# Patient Record
(demographics unavailable — no encounter records)

---

## 2024-11-27 NOTE — PHYSICAL EXAM
[No Acute Distress] : no acute distress [Normal Oropharynx] : normal oropharynx [III] : Mallampati Class: III [No Neck Mass] : no neck mass [Normal Rate/Rhythm] : normal rate/rhythm [Clear to Auscultation Bilaterally] : clear to auscultation bilaterally [No Abnormalities] : no abnormalities [Benign] : benign [Normal Gait] : normal gait [No Clubbing] : no clubbing [Normal Color/ Pigmentation] : normal color/ pigmentation [No Focal Deficits] : no focal deficits [Oriented x3] : oriented x3

## 2024-11-27 NOTE — REASON FOR VISIT
[Follow-Up] : a follow-up visit [Pulmonary Embolism] : pulmonary embolism [Pulmonary Hypertension] : pulmonary hypertension [Shortness of Breath] : shortness of breath

## 2025-04-11 NOTE — REVIEW OF SYSTEMS
[Palpitations] : palpitations [Fever] : no fever [Dry Eyes] : no dry eyes [Cough] : no cough [Chest Discomfort] : no chest discomfort [Hay Fever] : no hay fever [GERD] : no gerd [Nocturia] : no nocturia [Arthralgias] : no arthralgias [Raynaud] : no raynaud [Anemia] : no anemia [Headache] : no headache [Depression] : no depression [Diabetes] : no diabetes

## 2025-04-11 NOTE — HISTORY OF PRESENT ILLNESS
[Never] : never [TextBox_4] : This letter  is regarding your patient  who  attended pulmonary out patient office today.  I have reviewed  patient's  past history, social history, family history and medication list. I also  reviewed nurse practitioners/ and fellows  notes and assessment and agree with it.   The patient was referred by ---------------50 -year-old female history of submassive PE March 2021---S/P  -percutaneous mechanical thrombectomy with the Inari FlowTriever System ([employer], [place], [place]) for the treatment of acute massive/submassive pulmonary embolism (PE)----on Xarelto   ------No history of , fever, chills , rigors, chest pain, or hemoptysis. Questionable history of Raynaud's phenomenon. No h/o significant weight loss in last few months. No history of liver dysfunction , collagen vascular disorder or chronic thromboembolic disease. I would classify the patient's dyspnea as WHO  FUNCTIONAL CLASS II--------  ----Echo  date------ 2021-EF 70 , PASP 17  STRESS  ECHO ---7/2022 stress echo showed est pasp on exercise=77mhg ----Pft date--------- 2021---restrictive with decreased DLCO- - 6MWT 2021------- 435 METERS , no desaturation ---6MWT MARHCH- 2023--- 385 METERS WITH EVIDENCE OF DESATURATION   -07/20/2021--INTERPRETATION: CTA CHESTI TECHNIQUE: Enhanced helical images were obtained of the chest. Coronal and sagittal images were reconstructed. Images were obtained after the uneventful administration of 90 cc of nonionic intravenous contrast (Omnipaque 350). 10 cc of nonionic intravenous contrast (Omnipaque 350) was discarded. Maximum intensity projection images were generated. FINDINGS: Pulmonary Artery: Obscuration of multiple lobar and more distal branches in both lungs due to respiratory motion artifact. No pulmonary embolism in the main, right, or left pulmonary arteries. Enlarged main pulmonary artery at approximately 3.2 cm.  Lungs, airways and pleura: Patent airways to the segmental bronchi. Mild mosaic attenuation. 7 mm superior right lower lobe solid nodule (2-32). Small subpleural opacity in the anterobasal right lower lobe along the fissure (2-55). Unremarkable pleura.  Lymph nodes and mediastinum: No enlarged lymph nodes.  Heart, pericardium, and vasculature: Normal heart size. Unremarkable pericardium. Normal caliber aorta. IMPRESSION:  No pulmonary embolism in the main, right, and left pulmonary arteries. Multiple lobar and more distal branches are not well evaluated due to respiratory motion.  Mildly enlarged main pulmonary artery, which can be seen with pulmonary hypertension.  Right lower lobe 7 mm nodule of uncertain etiology. 3 month follow-up and/or comparison with outside imaging is recommended.   CT CHEST - ORDERED BY: ISIS BARKER   PROCEDURE DATE: 03/11/2022    INTERPRETATION: Clinical information: Shortness of breath and palpitations. Exam is compared to previous study of 11/30/2021.  CT scan of the chest was obtained without administration of intravenous contrast.  No hilar and or mediastinal adenopathy is noted.  Heart is normal in size. No pericardial effusion is noted. Main pulmonary artery measures 3.3 cm.  No endobronchial lesions are noted. 0.3 cm solid nodule in the left upper lobe is unchanged when compared to previous exam. Patchy opacity in the peripheral portion of the anterior segment of the right lower lobe has decreased when compared to previous exam. No pleural effusions are noted.  Below the diaphragm, visualized portions of the abdomen demonstrate small low-attenuation lesion in the liver which is too small to be adequately characterized on this exam.  Visualized osseous structures are within normal limits.  IMPRESSION: Patchy opacity in the peripheral aspect of the right lower lobe has decreased when compared to previous exam.  CTA chest 4/2023 IMPRESSION: No acute pulmonary thromboembolism. 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  ECHO 9/2023 CONCLUSIONS: 1. Left ventricular cavity is normally sized. Left ventricular wall thickness is normal. Left ventricular systolic function is normal. 2. Normal right ventricular cavity size and systolic function. 3. Trace aortic regurgitation. 4. Trace mitral regurgitation. 5. Compared to the transthoracic echocardiogram performed on 8/20/2021 there have been no significant interval changes.  CARDIAC CATH 3/20/23  mean PA 35, PCWP 15, PVR 3.3  VQ SCAN-2023-- high probability  NOV 2021--------     PT FEELS BETTER-patient feels her exercise tolerance has improved she is on Xarelto repeat CTIN VIEW OF LUNG NODULE   February 2022---H/O PE  March 2021---S/P  -percutaneous mechanical thrombectomy---- Complaints of intermittent dyspnea, remains on Xarelto, hemoglobin was low had D&C and iron transfusion since last OV, rash noted to bilateral legs following with derm  May 2022---- h/o PE March 2021 s/p percutaneous mechanical thrombectomy---- denies dyspnea but notes palpations on exertion, NEEDS STRESS ECHO----- Had sleep study noted improvement in sleep, NEEDS device--- repeat CT shows improvement  7/2022 stress echo showed est pasp on exercise=77mhg  9/2022 --- h/o PE March 2021 s/p percutaneous mechanical thrombectomy-----cted- on xarelto h/o VTE 2021, c/o LEON- relates to deconditioning--- 7/2022 stress echo showed est pasp on exercise=77mhg exercise-induced pulmonary hypertension------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 Cordova for possible thromboendarterectomy - - -----desaturation on 6-minute walk test advised supplemental oxygen OSA_ on nightly cpp and benefits from its use  4/2023 CTEPH- on adempas 0.5mg tid - still w/LEON ----awaiting to see Dr. Santo cardiac surgery for possible thromboendarterectomy----remains on Xarelto OSA_ on nightly cpap and benefits from its use--- needs mask fitting Accompanied by    6/2023 CTEPH - evaluated by Dr Santo/Dr Perez for possible thromboendarterectomy  Pt currently "thinking about it" But states that she feels well since starting adempas 1mg tid No resp issues CHAYITO- on nightly cpap- compliance report indicates that pt is only 27% compliant- reports that she is in need of new mask  OCT 2023------CTEPH - evaluated by Dr Santo/Dr Perez for possible thromboendarterectomy ---NOT SURE IF SHE WANTS TO PROCEED WITH IT - - - - - - - - - " --------     But states that she feels well since starting adempas 1mg tid No resp issues CHAYITO- on nightly cpap- compliance report indicates that pt is only 27% compliant- reports that she is in need of new mask---PFT  NEXT VISIST - - -  -  august 2024------H/O -CTEPH - evaluated by Dr Santo/Dr Perez for possible thromboendarterectomy ---NOT SURE IF SHE WANTS TO PROCEED WITH IT - - - - - - - - - " --------     But states that she feels well since starting adempas 1mg tid No resp issues CHAYITO- on nightly cpap- NOT COMPLIANT-----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

## 2025-04-11 NOTE — HISTORY OF PRESENT ILLNESS
[Never] : never [TextBox_4] : This letter  is regarding your patient  who  attended pulmonary out patient office today.  I have reviewed  patient's  past history, social history, family history and medication list. I also  reviewed nurse practitioners/ and fellows  notes and assessment and agree with it.   The patient was referred by ---------------50 -year-old female history of submassive PE March 2021---S/P  -percutaneous mechanical thrombectomy with the Inari FlowTriever System ([email or web address], [place], [place]) for the treatment of acute massive/submassive pulmonary embolism (PE)----on Xarelto   ------No history of , fever, chills , rigors, chest pain, or hemoptysis. Questionable history of Raynaud's phenomenon. No h/o significant weight loss in last few months. No history of liver dysfunction , collagen vascular disorder or chronic thromboembolic disease. I would classify the patient's dyspnea as WHO  FUNCTIONAL CLASS II--------  ----Echo  date------ 2021-EF 70 , PASP 17  STRESS  ECHO ---7/2022 stress echo showed est pasp on exercise=77mhg ----Pft date--------- 2021---restrictive with decreased DLCO- - 6MWT 2021------- 435 METERS , no desaturation ---6MWT MARHCH- 2023--- 385 METERS WITH EVIDENCE OF DESATURATION   -07/20/2021--INTERPRETATION: CTA CHESTI TECHNIQUE: Enhanced helical images were obtained of the chest. Coronal and sagittal images were reconstructed. Images were obtained after the uneventful administration of 90 cc of nonionic intravenous contrast (Omnipaque 350). 10 cc of nonionic intravenous contrast (Omnipaque 350) was discarded. Maximum intensity projection images were generated. FINDINGS: Pulmonary Artery: Obscuration of multiple lobar and more distal branches in both lungs due to respiratory motion artifact. No pulmonary embolism in the main, right, or left pulmonary arteries. Enlarged main pulmonary artery at approximately 3.2 cm.  Lungs, airways and pleura: Patent airways to the segmental bronchi. Mild mosaic attenuation. 7 mm superior right lower lobe solid nodule (2-32). Small subpleural opacity in the anterobasal right lower lobe along the fissure (2-55). Unremarkable pleura.  Lymph nodes and mediastinum: No enlarged lymph nodes.  Heart, pericardium, and vasculature: Normal heart size. Unremarkable pericardium. Normal caliber aorta. IMPRESSION:  No pulmonary embolism in the main, right, and left pulmonary arteries. Multiple lobar and more distal branches are not well evaluated due to respiratory motion.  Mildly enlarged main pulmonary artery, which can be seen with pulmonary hypertension.  Right lower lobe 7 mm nodule of uncertain etiology. 3 month follow-up and/or comparison with outside imaging is recommended.   CT CHEST - ORDERED BY: ISIS BARKER   PROCEDURE DATE: 03/11/2022    INTERPRETATION: Clinical information: Shortness of breath and palpitations. Exam is compared to previous study of 11/30/2021.  CT scan of the chest was obtained without administration of intravenous contrast.  No hilar and or mediastinal adenopathy is noted.  Heart is normal in size. No pericardial effusion is noted. Main pulmonary artery measures 3.3 cm.  No endobronchial lesions are noted. 0.3 cm solid nodule in the left upper lobe is unchanged when compared to previous exam. Patchy opacity in the peripheral portion of the anterior segment of the right lower lobe has decreased when compared to previous exam. No pleural effusions are noted.  Below the diaphragm, visualized portions of the abdomen demonstrate small low-attenuation lesion in the liver which is too small to be adequately characterized on this exam.  Visualized osseous structures are within normal limits.  IMPRESSION: Patchy opacity in the peripheral aspect of the right lower lobe has decreased when compared to previous exam.  CTA chest 4/2023 IMPRESSION: No acute pulmonary thromboembolism. 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  ECHO 9/2023 CONCLUSIONS: 1. Left ventricular cavity is normally sized. Left ventricular wall thickness is normal. Left ventricular systolic function is normal. 2. Normal right ventricular cavity size and systolic function. 3. Trace aortic regurgitation. 4. Trace mitral regurgitation. 5. Compared to the transthoracic echocardiogram performed on 8/20/2021 there have been no significant interval changes.  CARDIAC CATH 3/20/23  mean PA 35, PCWP 15, PVR 3.3  VQ SCAN-2023-- high probability  NOV 2021--------     PT FEELS BETTER-patient feels her exercise tolerance has improved she is on Xarelto repeat CTIN VIEW OF LUNG NODULE   February 2022---H/O PE  March 2021---S/P  -percutaneous mechanical thrombectomy---- Complaints of intermittent dyspnea, remains on Xarelto, hemoglobin was low had D&C and iron transfusion since last OV, rash noted to bilateral legs following with derm  May 2022---- h/o PE March 2021 s/p percutaneous mechanical thrombectomy---- denies dyspnea but notes palpations on exertion, NEEDS STRESS ECHO----- Had sleep study noted improvement in sleep, NEEDS device--- repeat CT shows improvement  7/2022 stress echo showed est pasp on exercise=77mhg  9/2022 --- h/o PE March 2021 s/p percutaneous mechanical thrombectomy-----cted- on xarelto h/o VTE 2021, c/o LEON- relates to deconditioning--- 7/2022 stress echo showed est pasp on exercise=77mhg exercise-induced pulmonary hypertension------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 Cordova for possible thromboendarterectomy - - -----desaturation on 6-minute walk test advised supplemental oxygen OSA_ on nightly cpp and benefits from its use  4/2023 CTEPH- on adempas 0.5mg tid - still w/LEON ----awaiting to see Dr. Santo cardiac surgery for possible thromboendarterectomy----remains on Xarelto OSA_ on nightly cpap and benefits from its use--- needs mask fitting Accompanied by    6/2023 CTEPH - evaluated by Dr Santo/Dr Perez for possible thromboendarterectomy  Pt currently "thinking about it" But states that she feels well since starting adempas 1mg tid No resp issues CHAYITO- on nightly cpap- compliance report indicates that pt is only 27% compliant- reports that she is in need of new mask  OCT 2023------CTEPH - evaluated by Dr Santo/Dr Perez for possible thromboendarterectomy ---NOT SURE IF SHE WANTS TO PROCEED WITH IT - - - - - - - - - " --------     But states that she feels well since starting adempas 1mg tid No resp issues CHAYITO- on nightly cpap- compliance report indicates that pt is only 27% compliant- reports that she is in need of new mask---PFT  NEXT VISIST - - -  -  august 2024------H/O -CTEPH - evaluated by Dr Santo/Dr Perez for possible thromboendarterectomy ---NOT SURE IF SHE WANTS TO PROCEED WITH IT - - - - - - - - - " --------     But states that she feels well since starting adempas 1mg tid No resp issues CHAYITO- on nightly cpap- NOT COMPLIANT-----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

## 2025-04-11 NOTE — DISCUSSION/SUMMARY
[FreeTextEntry1] : ---Assessment plan----------The patient has been referred here for further opinion regarding pulmonary problem, -- 50 -year-old female history of submassive PE March 2021---S/P  -percutaneous mechanical thrombectomy with the Inari FlowTriever System ([employer], [place], [place]) for the treatment of acute massive/submassive pulmonary embolism (PE)----on Xarelto  1- history of PE------S/P  -percutaneous mechanical thrombectomy with the Inari FlowTriever System ([employer], [place], [place]) -----  remains on Xarelto-   -  HAS  CTEPH- on adempas 1mg tid --I HAVE RECOMMENDED PTE  BUT PT STILL HESITANT-------- WAS EVENTUALLY  - evaluated by Dr Santo/Dr Perez for possible thromboendarterectomy  ---NOT SURE IF SHE WANTS TO PROCEED WITH IT - - - - - - - - - " --------     But states that she feels well since starting adempas 1mg ti  Menopause > 1 year  2) On Xarelto, had prev denied PTE at this time    3-CHAYITO  --- not using CPAP --- will try oral dental device; weight loss advised 4- Encouraged exercise and weight loss 5- 6MWT in office today 6 - repeat CT Chest Angio and TTE before next follow up -- f/u in NOV 2025-   Thanks for allowing  me to participate  in the care of this patient.  Patient at this time  will follow  the above mentioned recommendations and return back for follow up visit. If you have any questions  I can be reached  at # [phone] (office).  Francisco Javier Skinner MD, [facility]

## 2025-04-11 NOTE — DISCUSSION/SUMMARY
[FreeTextEntry1] : ---Assessment plan----------The patient has been referred here for further opinion regarding pulmonary problem, -- 50 -year-old female history of submassive PE March 2021---S/P  -percutaneous mechanical thrombectomy with the Inari FlowTriever System ([employer], [place], [place]) for the treatment of acute massive/submassive pulmonary embolism (PE)----on Xarelto  1- history of PE------S/P  -percutaneous mechanical thrombectomy with the Inari FlowTriever System ([employer], [place], [place]) -----  remains on Xarelto-   -  HAS  CTEPH- on adempas 1mg tid --I HAVE RECOMMENDED PTE  BUT PT STILL HESITANT-------- WAS EVENTUALLY  - evaluated by Dr Santo/Dr Perez for possible thromboendarterectomy  ---NOT SURE IF SHE WANTS TO PROCEED WITH IT - - - - - - - - - " --------     But states that she feels well since starting adempas 1mg ti  Menopause > 1 year  2) On Xarelto, had prev denied PTE at this time    3-CHAYITO  --- not using CPAP --- will try oral dental device; weight loss advised 4- Encouraged exercise and weight loss 5- 6MWT in office today 6 - repeat CT Chest Angio and TTE before next follow up -- f/u in NOV 2025-   Thanks for allowing  me to participate  in the care of this patient.  Patient at this time  will follow  the above mentioned recommendations and return back for follow up visit. If you have any questions  I can be reached  at # [phone] (office).  Francisco Javier Skinner MD, [facility]

## 2025-04-11 NOTE — END OF VISIT
[FreeTextEntry3] :   I, Dr. Francisco Javier Skinner  personally performed the evaluation and management (E/M) services for this established patient who presents today with (a) new problem(s)/exacerbation of (an) existing condition(s). That E/M includes conducting the clinically appropriate interval history &/or exam, assessing all new/exacerbated conditions, and establishing a new plan of care. Today, my UBALDO, Maureen Benitez NP, , was here to observe my evaluation and management service for this new problem/exacerbated condition and follow the plan of care established by me going forward. [Time Spent: ___ minutes] : I have spent [unfilled] minutes of time on the encounter which excludes teaching and separately reported services.